# Patient Record
Sex: FEMALE | Race: BLACK OR AFRICAN AMERICAN
[De-identification: names, ages, dates, MRNs, and addresses within clinical notes are randomized per-mention and may not be internally consistent; named-entity substitution may affect disease eponyms.]

---

## 2019-01-27 ENCOUNTER — HOSPITAL ENCOUNTER (EMERGENCY)
Dept: HOSPITAL 72 - EMR | Age: 39
Discharge: HOME | End: 2019-01-27
Payer: MEDICAID

## 2019-01-27 VITALS — DIASTOLIC BLOOD PRESSURE: 87 MMHG | SYSTOLIC BLOOD PRESSURE: 128 MMHG

## 2019-01-27 VITALS — DIASTOLIC BLOOD PRESSURE: 83 MMHG | SYSTOLIC BLOOD PRESSURE: 131 MMHG

## 2019-01-27 VITALS — BODY MASS INDEX: 25.23 KG/M2 | WEIGHT: 157 LBS | HEIGHT: 66 IN

## 2019-01-27 VITALS — DIASTOLIC BLOOD PRESSURE: 80 MMHG | SYSTOLIC BLOOD PRESSURE: 136 MMHG

## 2019-01-27 DIAGNOSIS — Z3A.08: ICD-10-CM

## 2019-01-27 DIAGNOSIS — O46.91: ICD-10-CM

## 2019-01-27 DIAGNOSIS — R10.30: Primary | ICD-10-CM

## 2019-01-27 LAB
ADD MANUAL DIFF: NO
ALBUMIN SERPL-MCNC: 3.2 G/DL (ref 3.4–5)
ALBUMIN/GLOB SERPL: 0.9 {RATIO} (ref 1–2.7)
ALP SERPL-CCNC: 57 U/L (ref 46–116)
ALT SERPL-CCNC: 21 U/L (ref 12–78)
ANION GAP SERPL CALC-SCNC: 9 MMOL/L (ref 5–15)
APPEARANCE UR: (no result)
APTT PPP: (no result) S
AST SERPL-CCNC: 13 U/L (ref 15–37)
BASOPHILS NFR BLD AUTO: 0.7 % (ref 0–2)
BILIRUB SERPL-MCNC: 0.2 MG/DL (ref 0.2–1)
BUN SERPL-MCNC: 9 MG/DL (ref 7–18)
CALCIUM SERPL-MCNC: 9 MG/DL (ref 8.5–10.1)
CHLORIDE SERPL-SCNC: 104 MMOL/L (ref 98–107)
CO2 SERPL-SCNC: 24 MMOL/L (ref 21–32)
CREAT SERPL-MCNC: 0.7 MG/DL (ref 0.55–1.3)
EOSINOPHIL NFR BLD AUTO: 1.5 % (ref 0–3)
ERYTHROCYTE [DISTWIDTH] IN BLOOD BY AUTOMATED COUNT: 12.6 % (ref 11.6–14.8)
GLOBULIN SER-MCNC: 3.5 G/DL
GLUCOSE UR STRIP-MCNC: NEGATIVE MG/DL
HCT VFR BLD CALC: 35.3 % (ref 37–47)
HGB BLD-MCNC: 11.5 G/DL (ref 12–16)
KETONES UR QL STRIP: NEGATIVE
LEUKOCYTE ESTERASE UR QL STRIP: NEGATIVE
LYMPHOCYTES NFR BLD AUTO: 15.8 % (ref 20–45)
MCV RBC AUTO: 80 FL (ref 80–99)
MONOCYTES NFR BLD AUTO: 4.7 % (ref 1–10)
NEUTROPHILS NFR BLD AUTO: 77.4 % (ref 45–75)
NITRITE UR QL STRIP: NEGATIVE
PH UR STRIP: 6.5 [PH] (ref 4.5–8)
PLATELET # BLD: 183 K/UL (ref 150–450)
POTASSIUM SERPL-SCNC: 3.7 MMOL/L (ref 3.5–5.1)
PROT UR QL STRIP: (no result)
RBC # BLD AUTO: 4.39 M/UL (ref 4.2–5.4)
SODIUM SERPL-SCNC: 137 MMOL/L (ref 136–145)
SP GR UR STRIP: 1.01 (ref 1–1.03)
UROBILINOGEN UR-MCNC: NORMAL MG/DL (ref 0–1)
WBC # BLD AUTO: 17.9 K/UL (ref 4.8–10.8)

## 2019-01-27 PROCEDURE — 81003 URINALYSIS AUTO W/O SCOPE: CPT

## 2019-01-27 PROCEDURE — 76830 TRANSVAGINAL US NON-OB: CPT

## 2019-01-27 PROCEDURE — 99284 EMERGENCY DEPT VISIT MOD MDM: CPT

## 2019-01-27 PROCEDURE — 85025 COMPLETE CBC W/AUTO DIFF WBC: CPT

## 2019-01-27 PROCEDURE — 80053 COMPREHEN METABOLIC PANEL: CPT

## 2019-01-27 PROCEDURE — 81025 URINE PREGNANCY TEST: CPT

## 2019-01-27 PROCEDURE — 74018 RADEX ABDOMEN 1 VIEW: CPT

## 2019-01-27 PROCEDURE — 36415 COLL VENOUS BLD VENIPUNCTURE: CPT

## 2019-01-27 PROCEDURE — 84702 CHORIONIC GONADOTROPIN TEST: CPT

## 2019-01-27 PROCEDURE — 76801 OB US < 14 WKS SINGLE FETUS: CPT

## 2019-01-27 PROCEDURE — 83690 ASSAY OF LIPASE: CPT

## 2019-01-27 NOTE — EMERGENCY ROOM REPORT
History of Present Illness


General


Chief Complaint:  Abdominal Pain


Source:  Patient





Present Illness


HPI


38-year-old female presents ED for evaluation.  Patient complaining of lower 

abdominal pain since yesterday.  Localized left lower quadrant, dull, 6 out of 

10, nonradiating.  Denies vaginal bleeding or discharge.  States that her last 

period was in early December.  He is not sure if she is pregnant.  Denies 

nausea or vomiting.  Denies any diarrhea.  No other aggravating relieving 

factors.  Denies any other associated symptoms


Allergies:  


Coded Allergies:  


     No Known Allergies (Unverified , 1/27/19)





Patient History


Past Medical History:  none


Past Surgical History:  none


Pertinent Family History:  none


Social History:  Denies: smoking, alcohol use, drug use


Last Menstrual Period:  12-1


Pregnant Now:  No - not sure


Immunizations:  UTD


Reviewed Nursing Documentation:  PMH: Agreed; PSxH: Agreed





Nursing Documentation-PMH


Past Medical History:  No Stated History





Review of Systems


All Other Systems:  negative except mentioned in HPI





Physical Exam





Vital Signs








  Date Time  Temp Pulse Resp B/P (MAP) Pulse Ox O2 Delivery O2 Flow Rate FiO2


 


1/27/19 14:24 98.4  18 131/83 98 Room Air  


 


1/27/19 14:24  101      








Sp02 EP Interpretation:  reviewed, normal


General Appearance:  no apparent distress, alert, GCS 15, non-toxic


Head:  normocephalic, atraumatic


Eyes:  bilateral eye normal inspection, bilateral eye PERRL


ENT:  hearing grossly normal, normal pharynx, no angioedema, normal voice


Neck:  full range of motion, supple/symm/no masses


Respiratory:  chest non-tender, lungs clear, normal breath sounds, speaking 

full sentences


Cardiovascular #1:  regular rate, rhythm, no edema


Cardiovascular #2:  2+ carotid (R), 2+ carotid (L), 2+ radial (R), 2+ radial (L)

, 2+ dorsalis pedis (R), 2+ dorsalis pedis (L)


Gastrointestinal:  normal bowel sounds, non tender, soft, non-distended, no 

guarding, no rebound


Rectal:  deferred


Genitourinary:  normal inspection, no CVA tenderness


Musculoskeletal:  back normal, gait/station normal, normal range of motion, non-

tender


Neurologic:  alert, oriented x3, responsive, motor strength/tone normal, 

sensory intact, speech normal


Psychiatric:  judgement/insight normal, memory normal, mood/affect normal, no 

suicidal/homicidal ideation


Reflexes:  3+ bicep (R), 3+ bicep (L), 3+ tricep (R), 3+ tricep (L), 3+ knee (R)

, 3+ knee (L)


Skin:  normal color, no rash, warm/dry, well hydrated


Lymphatic:  no adenopathy





Medical Decision Making





Last Vital Signs








  Date Time  Temp Pulse Resp B/P (MAP) Pulse Ox O2 Delivery O2 Flow Rate FiO2


 


1/27/19 14:34  101 18   Room Air  


 


1/27/19 14:24 98.4   131/83 98   








Referrals:  


[facility],REFERRING (PCP)











Satnam Grady MD Jan 27, 2019 16:23

## 2019-01-28 NOTE — DIAGNOSTIC IMAGING REPORT
Indication: Abdominal pain

 

Technique: Supine view of the abdomen

 

Comparison: none

 

Findings: Bowel gas pattern is unremarkable. No unusual masses or calcifications.

 

Impression: No acute process

## 2019-01-28 NOTE — DIAGNOSTIC IMAGING REPORT
Indication: Pelvic pain, pregnant patient

 

Technique: Transabdominal and transvaginal images. Doppler interrogation of the

bilateral ovaries

 

Comparison: none

 

Findings: Uterus measures 10.5 cm length by 6 cm AP PA. Within the right atrium,

there is a gestational sac. This contains a fetal pole with a crown-rump length of

2.1 cm. This corresponds to an estimated gestational age of 8 weeks 5 days. There is

positive fetal heart activity, fetal heart rate 173 bpm. There is a small area of

subchorionic hemorrhage measuring 17 x 8 mm. No myometrial abnormality. Left ovary

measures 3 cm in length. Right ovary measures 2.9 cm in length. Both ovaries

demonstrate normal blood flow. No free cul-de-sac fluid

 

Impression: Positive for 8 week 5 day, by crown-rump length measurement, single live

intrauterine pregnancy.

 

Small 17 x 8 mm subchorionic hemorrhage

## 2019-09-06 ENCOUNTER — HOSPITAL ENCOUNTER (EMERGENCY)
Dept: HOSPITAL 72 - EMR | Age: 39
Discharge: ANOTHER HOSPITAL | End: 2019-09-06
Payer: MEDICAID

## 2019-09-06 VITALS — BODY MASS INDEX: 29.25 KG/M2 | HEIGHT: 66 IN | WEIGHT: 182 LBS

## 2019-09-06 VITALS — SYSTOLIC BLOOD PRESSURE: 142 MMHG | DIASTOLIC BLOOD PRESSURE: 85 MMHG

## 2019-09-06 VITALS — SYSTOLIC BLOOD PRESSURE: 146 MMHG | DIASTOLIC BLOOD PRESSURE: 79 MMHG

## 2019-09-06 VITALS — SYSTOLIC BLOOD PRESSURE: 138 MMHG | DIASTOLIC BLOOD PRESSURE: 90 MMHG

## 2019-09-06 VITALS — DIASTOLIC BLOOD PRESSURE: 73 MMHG | SYSTOLIC BLOOD PRESSURE: 142 MMHG

## 2019-09-06 DIAGNOSIS — I50.1: ICD-10-CM

## 2019-09-06 DIAGNOSIS — R06.00: ICD-10-CM

## 2019-09-06 DIAGNOSIS — I31.3: Primary | ICD-10-CM

## 2019-09-06 LAB
ADD MANUAL DIFF: NO
ALBUMIN SERPL-MCNC: 2.8 G/DL (ref 3.4–5)
ALBUMIN/GLOB SERPL: 0.6 {RATIO} (ref 1–2.7)
ALP SERPL-CCNC: 98 U/L (ref 46–116)
ALT SERPL-CCNC: 48 U/L (ref 12–78)
ANION GAP SERPL CALC-SCNC: 11 MMOL/L (ref 5–15)
APPEARANCE UR: (no result)
APTT PPP: YELLOW S
AST SERPL-CCNC: 25 U/L (ref 15–37)
BASOPHILS NFR BLD AUTO: 0.5 % (ref 0–2)
BILIRUB SERPL-MCNC: 0.6 MG/DL (ref 0.2–1)
BUN SERPL-MCNC: 16 MG/DL (ref 7–18)
CALCIUM SERPL-MCNC: 9.2 MG/DL (ref 8.5–10.1)
CHLORIDE SERPL-SCNC: 105 MMOL/L (ref 98–107)
CK MB SERPL-MCNC: 2.1 NG/ML (ref 0–3.6)
CK SERPL-CCNC: 158 U/L (ref 26–308)
CO2 SERPL-SCNC: 24 MMOL/L (ref 21–32)
CREAT SERPL-MCNC: 0.9 MG/DL (ref 0.55–1.3)
EOSINOPHIL NFR BLD AUTO: 2.8 % (ref 0–3)
ERYTHROCYTE [DISTWIDTH] IN BLOOD BY AUTOMATED COUNT: 14.5 % (ref 11.6–14.8)
GLOBULIN SER-MCNC: 4.4 G/DL
GLUCOSE UR STRIP-MCNC: NEGATIVE MG/DL
HCT VFR BLD CALC: 40 % (ref 37–47)
HGB BLD-MCNC: 13.1 G/DL (ref 12–16)
KETONES UR QL STRIP: NEGATIVE
LEUKOCYTE ESTERASE UR QL STRIP: (no result)
LYMPHOCYTES NFR BLD AUTO: 15.8 % (ref 20–45)
MCV RBC AUTO: 80 FL (ref 80–99)
MONOCYTES NFR BLD AUTO: 7 % (ref 1–10)
NEUTROPHILS NFR BLD AUTO: 73.9 % (ref 45–75)
NITRITE UR QL STRIP: NEGATIVE
PH UR STRIP: 5 [PH] (ref 4.5–8)
PLATELET # BLD: 275 K/UL (ref 150–450)
POTASSIUM SERPL-SCNC: 3.7 MMOL/L (ref 3.5–5.1)
PROT UR QL STRIP: (no result)
RBC # BLD AUTO: 5.02 M/UL (ref 4.2–5.4)
SODIUM SERPL-SCNC: 140 MMOL/L (ref 136–145)
SP GR UR STRIP: 1.01 (ref 1–1.03)
UROBILINOGEN UR-MCNC: NORMAL MG/DL (ref 0–1)
WBC # BLD AUTO: 13.9 K/UL (ref 4.8–10.8)

## 2019-09-06 PROCEDURE — 99291 CRITICAL CARE FIRST HOUR: CPT

## 2019-09-06 PROCEDURE — 36415 COLL VENOUS BLD VENIPUNCTURE: CPT

## 2019-09-06 PROCEDURE — 82553 CREATINE MB FRACTION: CPT

## 2019-09-06 PROCEDURE — 80307 DRUG TEST PRSMV CHEM ANLYZR: CPT

## 2019-09-06 PROCEDURE — 84484 ASSAY OF TROPONIN QUANT: CPT

## 2019-09-06 PROCEDURE — 80053 COMPREHEN METABOLIC PANEL: CPT

## 2019-09-06 PROCEDURE — 93306 TTE W/DOPPLER COMPLETE: CPT

## 2019-09-06 PROCEDURE — 82550 ASSAY OF CK (CPK): CPT

## 2019-09-06 PROCEDURE — 71275 CT ANGIOGRAPHY CHEST: CPT

## 2019-09-06 PROCEDURE — 83880 ASSAY OF NATRIURETIC PEPTIDE: CPT

## 2019-09-06 PROCEDURE — 85025 COMPLETE CBC W/AUTO DIFF WBC: CPT

## 2019-09-06 PROCEDURE — 93005 ELECTROCARDIOGRAM TRACING: CPT

## 2019-09-06 PROCEDURE — 81025 URINE PREGNANCY TEST: CPT

## 2019-09-06 PROCEDURE — 96365 THER/PROPH/DIAG IV INF INIT: CPT

## 2019-09-06 PROCEDURE — 81003 URINALYSIS AUTO W/O SCOPE: CPT

## 2019-09-06 PROCEDURE — 87086 URINE CULTURE/COLONY COUNT: CPT

## 2019-09-06 NOTE — NUR
ED Nurse Note:

Patient walked into ED c/o sub sternal chest pain that he rates 7/10 pain, 
states that this has been an going issue for the past week, patient recently 
gave birth 7 days ago and states that the symptoms started after giving birth, 
patient did take a ibuprofen earlier at 1230 today and has found no relief. IV 
started on left AC 20 gauge, patient is alert and oriented x4 and walks with a 
steady gait

## 2019-09-06 NOTE — EMERGENCY ROOM REPORT
History of Present Illness


General


Chief Complaint:  Chest Pain


Source:  Patient


 (Js Garcia DO)





Present Illness


HPI


Patient presents with complaints of left upper chest pain


Reports that the pain has been ongoing ever since  delivery on 


There is a pleuritic component to it


Denies any vomiting or diarrhea denies any abdominal pain


She feels that laying on her left side improved symptoms


Denies any recent travel otherwise denies any fevers or cough


 (Js Garcia DO)


Allergies:  


Coded Allergies:  


     No Known Allergies (Unverified , 19)





Patient History


Past Medical History:  see triage record


Pregnant Now:  No


:  3


Para:  2


Reviewed Nursing Documentation:  PMH: Agreed; PSxH: Agreed (Js Garcia DO)





Nursing Documentation-PMH


Past Medical History:  No History, Except For


Hx Cardiac Problems:  No - 


Hx Hypertension:  Yes


Hx Pacemaker:  No


Hx Asthma:  No


Hx COPD:  No


Hx Diabetes:  No


Hx Cancer:  No


Hx Gastrointestinal Problems:  No


Hx Dialysis:  No


Hx Neurological Problems:  No


Hx Cerebrovascular Accident:  No


Hx Seizures:  No


 (Js Garcia DO)





Review of Systems


All Other Systems:  negative except mentioned in HPI


 (Js Garcia DO)





Physical Exam





Vital Signs








  Date Time  Temp Pulse Resp B/P (MAP) Pulse Ox O2 Delivery O2 Flow Rate FiO2


 


19 12:50 98.8 112 18 146/79 (101) 99 Room Air  








Sp02 EP Interpretation:  reviewed, normal


General Appearance:  well appearing, no apparent distress


Head:  normocephalic, atraumatic


Eyes:  bilateral eye PERRL, bilateral eye EOMI


ENT:  hearing grossly normal, normal pharynx, TMs + canals normal, uvula midline


Neck:  full range of motion, supple, no meningismus, no bony tend


Respiratory:  lungs clear, normal breath sounds, no rhonchi, no respiratory 

distress, no retraction, no accessory muscle use


Cardiovascular #1:  normal peripheral pulses, no edema, no gallop, no JVD, no 

murmur, tachycardia


Gastrointestinal:  normal bowel sounds, non tender, soft, no mass, no 

organomegaly, non-distended, no guarding, no hernia, no pulsatile mass, no 

rebound


Genitourinary:  no CVA tenderness


Musculoskeletal:  normal inspection


Neurologic:  oriented x3, responsive, CNs III-XII nml as tested, motor strength/

tone normal, sensory intact


Psychiatric:  mood/affect normal


Skin:  no rash


Lymphatic:  normal inspection, no adenopathy


 (Js Garcia DO)





Procedures


Critical Care Time


Critical Care Time


i.  I feel this is a highly complex case requiring extensive working including 

EKG/Rhythm strip, Xray/CT/US, Blood/urine lab work, repeat exams while in ED, 


and administration of strong opiates/narcotics for pain control, admission to 

hospital or close patient follow up.  





Total time: 60 min bedside evaluation and treatment excludes procedures (EKG). 


Reason for critical care: dyspnea, pericardial effusion. 


Possible complications: hypotension, hypertension, MI, shock, arrhythmias, 

metabolic acidosis, end organ damage, respiratory failure. 


Interventions: CTA, discussion with cardiology, stat 2D ECHO


Course: Presenting with shortness of breath.  Status post  on .  

Concern for PE.  CT angio shows no evidence of PE but there is pericardial 

effusion.  Discussed with cardiology.  Concern for postpartum cardiomyopathy.  

Recommended stat 2D echo which shows LV hypokinesis with EF 40 to 45% with 

small pericardial effusion.  LV diastolic dysfunction grade 1.  Mild pulmonary 

hypertension.  Consistent with possible postpartum cardiomyopathy.  Patient 

remains dyspneic.  Somewhat tachycardic.  Recommended to be admitted


Consultations: nursing staff, EMS, family 


Performed by: Dr Grady


Tolerated well condition = serious





j.  because of unstable vital signs this patient had a condition that could 

potentially threaten life or limb.  I feel this is a critical patient who 

required my full attention while patient was considered critical.  Total 

Critical Care Time excluding procedures was greater than 60 minutes


 (Satnam Grady MD)





Medical Decision Making


Diagnostic Impression:  


 Primary Impression:  


 Dyspnea


 Qualified Codes:  R06.00 - Dyspnea, unspecified


 Additional Impressions:  


 Left ventricular hypokinesis


 Pericardial effusion


ER Course


Hospital Course 


39 year-old female presents ED complaining of SOB. s/p csection on .





patient initially seen and evaluated by Dr Garcia; see his note for full 

history and physical





Clinical course





Labsnoted leukocytosis, hemoglobin/hematocrit stable, electro lites okay, 

troponins negative, UDS negative, UA positive bacteria


EKGsinus tachycardia, no acute ischemic changes interpreted by me 


CTA chest -evidence of PE however pericardial effusion noted





discussed with cardiology.  Recommended 2D echo.  2D echo ordered in ED and 

shows LV hypokinesis, reduced EF.  concern for postpartum cardiomyopathy





patient remains dyspneic.   antibiotics given for UTI





Because of insurance patient will be transferred





I.  I feel this is a highly complex case requiring extensive working including 

EKG/Rhythm strip, Xray/CT/US, Blood/urine lab work, repeat exams while in ED, 

and administration of strong opiates/narcotics for pain control, admission to 

hospital or close patient follow up.  





Diagnosis - dyspnea, LV hypokinesis, pericardial effusion


Transferred in serious condition





Labs








Test


  19


13:20 19


13:40


 


White Blood Count


  13.9 K/UL


(4.8-10.8) 


 


 


Red Blood Count


  5.02 M/UL


(4.20-5.40) 


 


 


Hemoglobin


  13.1 G/DL


(12.0-16.0) 


 


 


Hematocrit


  40.0 %


(37.0-47.0) 


 


 


Mean Corpuscular Volume 80 FL (80-99)  


 


Mean Corpuscular Hemoglobin


  26.2 PG


(27.0-31.0) 


 


 


Mean Corpuscular Hemoglobin


Concent 32.8 G/DL


(32.0-36.0) 


 


 


Red Cell Distribution Width


  14.5 %


(11.6-14.8) 


 


 


Platelet Count


  275 K/UL


(150-450) 


 


 


Mean Platelet Volume


  7.4 FL


(6.5-10.1) 


 


 


Neutrophils (%) (Auto)


  73.9 %


(45.0-75.0) 


 


 


Lymphocytes (%) (Auto)


  15.8 %


(20.0-45.0) 


 


 


Monocytes (%) (Auto)


  7.0 %


(1.0-10.0) 


 


 


Eosinophils (%) (Auto)


  2.8 %


(0.0-3.0) 


 


 


Basophils (%) (Auto)


  0.5 %


(0.0-2.0) 


 


 


Sodium Level


  140 MMOL/L


(136-145) 


 


 


Potassium Level


  3.7 MMOL/L


(3.5-5.1) 


 


 


Chloride Level


  105 MMOL/L


() 


 


 


Carbon Dioxide Level


  24 MMOL/L


(21-32) 


 


 


Anion Gap


  11 mmol/L


(5-15) 


 


 


Blood Urea Nitrogen


  16 mg/dL


(7-18) 


 


 


Creatinine


  0.9 MG/DL


(0.55-1.30) 


 


 


Estimat Glomerular Filtration


Rate > 60 mL/min


(>60) 


 


 


Glucose Level


  104 MG/DL


() 


 


 


Calcium Level


  9.2 MG/DL


(8.5-10.1) 


 


 


Total Bilirubin


  0.6 MG/DL


(0.2-1.0) 


 


 


Aspartate Amino Transf


(AST/SGOT) 25 U/L (15-37) 


  


 


 


Alanine Aminotransferase


(ALT/SGPT) 48 U/L (12-78) 


  


 


 


Alkaline Phosphatase


  98 U/L


() 


 


 


Total Creatine Kinase


  158 U/L


() 


 


 


Creatine Kinase MB


  2.1 NG/ML


(0.0-3.6) 


 


 


Creatine Kinase MB Relative


Index 1.3 


  


 


 


Troponin I


  0.000 ng/mL


(0.000-0.056) 


 


 


Pro-B-Type Natriuretic Peptide


  68 pg/mL


(0-125) 


 


 


Total Protein


  7.2 G/DL


(6.4-8.2) 


 


 


Albumin


  2.8 G/DL


(3.4-5.0) 


 


 


Globulin 4.4 g/dL  


 


Albumin/Globulin Ratio 0.6 (1.0-2.7)  


 


Urine Color  Yellow 


 


Urine Appearance


  


  Slightly


cloudy


 


Urine pH  5 (4.5-8.0) 


 


Urine Specific Gravity


  


  1.015


(1.005-1.035)


 


Urine Protein  2+ (NEGATIVE) 


 


Urine Glucose (UA)


  


  Negative


(NEGATIVE)


 


Urine Ketones


  


  Negative


(NEGATIVE)


 


Urine Blood  4+ (NEGATIVE) 


 


Urine Nitrite


  


  Negative


(NEGATIVE)


 


Urine Bilirubin


  


  Negative


(NEGATIVE)


 


Urine Urobilinogen


  


  Normal MG/DL


(0.0-1.0)


 


Urine Leukocyte Esterase  1+ (NEGATIVE) 


 


Urine RBC


  


  5-10 /HPF (0 -


2)


 


Urine WBC


  


  2-4 /HPF (0 -


2)


 


Urine Squamous Epithelial


Cells 


  Moderate /LPF


(NONE/OCC)


 


Urine Bacteria


  


  Moderate /HPF


(NONE)


 


Urine HCG, Qualitative


  


  Negative


(NEGATIVE)


 


Urine Opiates Screen


  


  Negative


(NEGATIVE)


 


Urine Barbiturates Screen


  


  Negative


(NEGATIVE)


 


Phencyclidine (PCP) Screen


  


  Negative


(NEGATIVE)


 


Urine Amphetamines Screen


  


  Negative


(NEGATIVE)


 


Urine Benzodiazepines Screen


  


  Negative


(NEGATIVE)


 


Urine Cocaine Screen


  


  Negative


(NEGATIVE)


 


Urine Marijuana (THC) Screen


  


  Negative


(NEGATIVE)








 (Satnam Grady MD)





EKG Diagnostic Results


Rate:  tachycardiac


Rhythm:  NSR


ST Segments:  no acute changes


ASA given to the pt in ED:  No


 (Satnam Grady MD)





Rhythm Strip Diag. Results


EP Interpretation:  yes


Rhythm:  NSR, no PVC's, no ectopy


 (Satnam Grady MD)





CT/MRI/US Diagnostic Results


CT/MRI/US Diagnostic Results :  


   Imaging Test Ordered:  CTA


   Impression


Findings: The pulmonary artery is well opacified and shows no filling defects. 

There


is no adenopathy. There is no aortic dissection or aneurysm identified within 

the


chest. Trace pericardial effusion demonstrated. Trace bilateral pleural 

effusions are


present with posterior basal atelectasis suspected given the streaky densities. 

No


airspace consolidation seen. Visualized part of the upper abdomen is 

unremarkable.


 (Santam Grady MD)





Last Vital Signs








  Date Time  Temp Pulse Resp B/P (MAP) Pulse Ox O2 Delivery O2 Flow Rate FiO2


 


19 12:55  112 20   Room Air  


 


19 12:55 98.8   146/79 99   








 (Js Garcia DO)


Status:  improved


 (Satnam Grady MD)


[facility]  [facility]


Condition:  Serious











Js Garcia DO Sep 6, 2019 13:33


Satnam Grady MD Sep 6, 2019 17:54

## 2019-09-08 NOTE — CARDIOLOGY REPORT
--------------- APPROVED REPORT --------------





EKG Measurement

Heart [id number]

SC [id number]

[id number]

[id number]

[id number]





Sinus tachycardia

Right atrial enlargement

Borderline ECG

## 2019-09-09 NOTE — CARDIOLOGY REPORT
--------------- APPROVED REPORT --------------





EXAM: Two-dimensional and M-mode echocardiogram with Doppler and color Doppler.



INDICATION

Shortness of breath



M-Mode DIMENSIONS 

IVSd1.4 (0.7-1.1cm)Left Atrium (MM)3.1 (1.6-4.0cm)

LVDd4.0 (3.5-5.6cm)Aortic Root2.8 (2.0-3.7cm)

PWd1.0 (0.7-1.1cm)Aortic Cusp Exc.2.0 (1.5-2.0cm)



LVDs3.0 (2.5-4.0cm)

PWs1.5 cm





Technically difficult study due to poor acoustic windows.

Study quality precludes accurate assessment of regional wall motion, but septal motion is 

abnormal.

Normal left ventricular chamber size.

Global left ventricular hypokinesis.

Left ventricular ejection fraction estimated to be mildly decreased.

Mild left ventricular hypertrophy.

Small posterior pericardial effusion.

All other cardiac chamber sizes are within normal limits. 

Normal appearing aortic, mitral, pulmonic and tricuspid valves.

Mild mitral annulus and aortic root calcification.

IVC is normal in size with physiological collapse. 



A  color flow and spectral Doppler study was performed and revealed:

No aortic regurgitation.

Mild to moderate mitral regurgitation.

Mitral diastolic velocities suggest mild left ventricular diastolic dysfunction (Grade 

I). 

Trace tricuspid regurgitation.

Tricuspid systolic velocities suggests peak right ventricular systolic pressure of 35 

mmHg, consistent with mild pulmonary hypertension.

No pulmonic regurgitation present.

## 2020-03-12 ENCOUNTER — HOSPITAL ENCOUNTER (EMERGENCY)
Dept: HOSPITAL 72 - EMR | Age: 40
Discharge: HOME | End: 2020-03-12
Payer: MEDICAID

## 2020-03-12 VITALS — DIASTOLIC BLOOD PRESSURE: 71 MMHG | SYSTOLIC BLOOD PRESSURE: 114 MMHG

## 2020-03-12 VITALS — HEIGHT: 66 IN | WEIGHT: 180 LBS | BODY MASS INDEX: 28.93 KG/M2

## 2020-03-12 DIAGNOSIS — I10: ICD-10-CM

## 2020-03-12 DIAGNOSIS — R51: Primary | ICD-10-CM

## 2020-03-12 LAB
ADD MANUAL DIFF: NO
ALBUMIN SERPL-MCNC: 3.1 G/DL (ref 3.4–5)
ALBUMIN/GLOB SERPL: 0.8 {RATIO} (ref 1–2.7)
ALP SERPL-CCNC: 85 U/L (ref 46–116)
ALT SERPL-CCNC: 58 U/L (ref 12–78)
ANION GAP SERPL CALC-SCNC: 12 MMOL/L (ref 5–15)
APPEARANCE UR: (no result)
APTT PPP: (no result) S
AST SERPL-CCNC: 23 U/L (ref 15–37)
BASOPHILS NFR BLD AUTO: 1.3 % (ref 0–2)
BILIRUB SERPL-MCNC: 0.3 MG/DL (ref 0.2–1)
BUN SERPL-MCNC: 15 MG/DL (ref 7–18)
CALCIUM SERPL-MCNC: 9.2 MG/DL (ref 8.5–10.1)
CHLORIDE SERPL-SCNC: 102 MMOL/L (ref 98–107)
CO2 SERPL-SCNC: 24 MMOL/L (ref 21–32)
CREAT SERPL-MCNC: 0.9 MG/DL (ref 0.55–1.3)
EOSINOPHIL NFR BLD AUTO: 0.6 % (ref 0–3)
ERYTHROCYTE [DISTWIDTH] IN BLOOD BY AUTOMATED COUNT: 13.1 % (ref 11.6–14.8)
GLOBULIN SER-MCNC: 3.9 G/DL
GLUCOSE UR STRIP-MCNC: NEGATIVE MG/DL
HCT VFR BLD CALC: 38.4 % (ref 37–47)
HGB BLD-MCNC: 13 G/DL (ref 12–16)
KETONES UR QL STRIP: (no result)
LEUKOCYTE ESTERASE UR QL STRIP: (no result)
LYMPHOCYTES NFR BLD AUTO: 29.3 % (ref 20–45)
MCV RBC AUTO: 75 FL (ref 80–99)
MONOCYTES NFR BLD AUTO: 11.8 % (ref 1–10)
NEUTROPHILS NFR BLD AUTO: 57.1 % (ref 45–75)
NITRITE UR QL STRIP: POSITIVE
PH UR STRIP: 5 [PH] (ref 4.5–8)
PLATELET # BLD: 198 K/UL (ref 150–450)
POTASSIUM SERPL-SCNC: 3.9 MMOL/L (ref 3.5–5.1)
PROT UR QL STRIP: (no result)
RBC # BLD AUTO: 5.13 M/UL (ref 4.2–5.4)
SODIUM SERPL-SCNC: 138 MMOL/L (ref 136–145)
SP GR UR STRIP: 1.02 (ref 1–1.03)
UROBILINOGEN UR-MCNC: 1 MG/DL (ref 0–1)
WBC # BLD AUTO: 7.3 K/UL (ref 4.8–10.8)

## 2020-03-12 PROCEDURE — 85025 COMPLETE CBC W/AUTO DIFF WBC: CPT

## 2020-03-12 PROCEDURE — 83690 ASSAY OF LIPASE: CPT

## 2020-03-12 PROCEDURE — 80053 COMPREHEN METABOLIC PANEL: CPT

## 2020-03-12 PROCEDURE — 99283 EMERGENCY DEPT VISIT LOW MDM: CPT

## 2020-03-12 PROCEDURE — 81003 URINALYSIS AUTO W/O SCOPE: CPT

## 2020-03-12 PROCEDURE — 71045 X-RAY EXAM CHEST 1 VIEW: CPT

## 2020-03-12 PROCEDURE — 36415 COLL VENOUS BLD VENIPUNCTURE: CPT

## 2020-03-12 NOTE — EMERGENCY ROOM REPORT
History of Present Illness


General


Chief Complaint:  Headache


Source:  Patient





Present Illness


HPI


Patient presents with complaints of headache body ache ongoing for the past 3 

days complains of mild sore throat and left flank pain as well denies any 

vomiting or diarrhea denies any lower abdominal pain denies any recent travel





Denies any contact with known coronavirus cases


Denies any posterior neck pain or photophobia


Allergies:  


Coded Allergies:  


     No Known Allergies (Unverified , 19)





Patient History


Past Medical History:  see triage record


Last Menstrual Period:  irregular


Pregnant Now:  No


Reviewed Nursing Documentation:  PMH: Agreed; PSxH: Agreed





Nursing Documentation-PMH


Hx Cardiac Problems:  No - 


Hx Hypertension:  Yes


Hx Pacemaker:  No


Hx Asthma:  No


Hx COPD:  No


Hx Diabetes:  No


Hx Cancer:  No


Hx Gastrointestinal Problems:  No


Hx Dialysis:  No


Hx Neurological Problems:  No


Hx Cerebrovascular Accident:  No


Hx Seizures:  No





Review of Systems


All Other Systems:  negative except mentioned in HPI





Physical Exam





Vital Signs








  Date Time  Temp Pulse Resp B/P (MAP) Pulse Ox O2 Delivery O2 Flow Rate FiO2


 


3/12/20 11:25 99.3 114 17 114/71 (85) 97 Room Air  








Sp02 EP Interpretation:  reviewed, normal


General Appearance:  well appearing, no apparent distress


Head:  normocephalic, atraumatic


Eyes:  bilateral eye PERRL, bilateral eye EOMI


ENT:  hearing grossly normal, normal pharynx, TMs + canals normal, uvula midline


Neck:  full range of motion, supple, no meningismus, no bony tend


Respiratory:  lungs clear, normal breath sounds, no rhonchi, no respiratory 

distress, no retraction, no accessory muscle use


Cardiovascular #1:  normal peripheral pulses, regular rate, rhythm, no edema, 

no gallop, no JVD, no murmur


Gastrointestinal:  normal bowel sounds, non tender, soft, no mass, no 

organomegaly, non-distended, no guarding, no hernia, no pulsatile mass, no 

rebound


Genitourinary:  no CVA tenderness


Musculoskeletal:  normal inspection


Neurologic:  motor strength/tone normal, CNs III-XII nml as tested, oriented x3

, sensory intact, responsive


Psychiatric:  mood/affect normal


Skin:  no rash


Lymphatic:  normal inspection, no adenopathy





Medical Decision Making


Diagnostic Impression:  


 Primary Impression:  


 Headache


ER Course


Multiple differentials including but not limited to neurological, neurosurgical

, infectious process were entertained


Patient had blood work initiated





All within normal limits


Patient's urine sample shows a large amount of blood


Urine nitrite was reported positive however leukocytes and bacteria were 

negative possibly from the blood


Patient does not have any dysuria symptoms and at this time stable for close 

outpatient follow-up





Labs








Test


  3/12/20


11:40


 


White Blood Count


  7.3 K/UL


(4.8-10.8)


 


Red Blood Count


  5.13 M/UL


(4.20-5.40)


 


Hemoglobin


  13.0 G/DL


(12.0-16.0)


 


Hematocrit


  38.4 %


(37.0-47.0)


 


Mean Corpuscular Volume 75 FL (80-99) 


 


Mean Corpuscular Hemoglobin


  25.3 PG


(27.0-31.0)


 


Mean Corpuscular Hemoglobin


Concent 33.8 G/DL


(32.0-36.0)


 


Red Cell Distribution Width


  13.1 %


(11.6-14.8)


 


Platelet Count


  198 K/UL


(150-450)


 


Mean Platelet Volume


  8.6 FL


(6.5-10.1)


 


Neutrophils (%) (Auto)


  57.1 %


(45.0-75.0)


 


Lymphocytes (%) (Auto)


  29.3 %


(20.0-45.0)


 


Monocytes (%) (Auto)


  11.8 %


(1.0-10.0)


 


Eosinophils (%) (Auto)


  0.6 %


(0.0-3.0)


 


Basophils (%) (Auto)


  1.3 %


(0.0-2.0)


 


Urine Color Red 


 


Urine Appearance Cloudy 


 


Urine pH 5 (4.5-8.0) 


 


Urine Specific Gravity


  1.020


(1.005-1.035)


 


Urine Protein 3+ (NEGATIVE) 


 


Urine Glucose (UA)


  Negative


(NEGATIVE)


 


Urine Ketones 1+ (NEGATIVE) 


 


Urine Blood 5+ (NEGATIVE) 


 


Urine Nitrite


  Positive


(NEGATIVE)


 


Urine Bilirubin


  Negative


(NEGATIVE)


 


Urine Urobilinogen


  1 MG/DL


(0.0-1.0)


 


Urine Leukocyte Esterase 1+ (NEGATIVE) 


 


Urine RBC


  Tntc /HPF (0 -


2)


 


Urine WBC


  2-4 /HPF (0 -


2)


 


Urine Squamous Epithelial


Cells Few /LPF


(NONE/OCC)


 


Urine Bacteria


  Few /HPF


(NONE)


 


Sodium Level


  138 MMOL/L


(136-145)


 


Potassium Level


  3.9 MMOL/L


(3.5-5.1)


 


Chloride Level


  102 MMOL/L


()


 


Carbon Dioxide Level


  24 MMOL/L


(21-32)


 


Anion Gap


  12 mmol/L


(5-15)


 


Blood Urea Nitrogen


  15 mg/dL


(7-18)


 


Creatinine


  0.9 MG/DL


(0.55-1.30)


 


Estimat Glomerular Filtration


Rate > 60 mL/min


(>60)


 


Glucose Level


  116 MG/DL


()


 


Calcium Level


  9.2 MG/DL


(8.5-10.1)


 


Total Bilirubin


  0.3 MG/DL


(0.2-1.0)


 


Aspartate Amino Transf


(AST/SGOT) 23 U/L (15-37) 


 


 


Alanine Aminotransferase


(ALT/SGPT) 58 U/L (12-78) 


 


 


Alkaline Phosphatase


  85 U/L


()


 


Total Protein


  7.0 G/DL


(6.4-8.2)


 


Albumin


  3.1 G/DL


(3.4-5.0)


 


Globulin 3.9 g/dL 


 


Albumin/Globulin Ratio 0.8 (1.0-2.7) 


 


Lipase


  159 U/L


()








Chest X-Ray Diagnostic Results


Chest X-Ray Diagnostic Results :  


   Chest X-Ray Ordered:  Yes


   # of Views/Limited/Complete:  1 View


   Indication:  Chest Pain


   EP Interpretation:  Yes


   Interpretation:  no consolidation, no effusion, no pneumothorax


   Impression:  No acute disease


   Electronically Signed by:  Js Garcia DO





Last Vital Signs








  Date Time  Temp Pulse Resp B/P (MAP) Pulse Ox O2 Delivery O2 Flow Rate FiO2


 


3/12/20 11:46 99.3  17 114/71 97 Room Air  


 


3/12/20 11:25  114      








Status:  improved


Disposition:  HOME, SELF-CARE


Condition:  Improved


Scripts


Nitrofurantoin Monohyd/M-Cryst* (MACROBID 100 MG*) 100 Mg Capsule


100 MG ORAL EVERY 12 HOURS for 5 Days, CAP


   Prov: Js Garcia DO         3/12/20 


Oseltamivir Phosphate (Tamiflu) 75 Mg Capsule


75 MG ORAL TWICE A DAY for 5 Days, CAP


   Prov: Js Garcia DO         3/12/20 


Ibuprofen* (MOTRIN*) 600 Mg Tablet


600 MG ORAL Q8H PRN for For Pain, #20 TAB 0 Refills


   Prov: Js Garcia DO         3/12/20





Additional Instructions:  


Patient is provided with the discharge instructions notified to follow up with 

primary doctor in the next 2-3 days otherwise return to the er with any 

worsening symptoms.


Please note that this report is being documented using DRAGON technology.  This 

can lead to erroneous entry secondary to incorrect interpretation by the 

dictating instrument.











Js Garcia DO Mar 12, 2020 13:09

## 2020-03-12 NOTE — NUR
ED Nurse Note:



Pt ambulated to ED from home with c/o generalized body weakness and fever x 3 
days. Pt denies any recent travels nor exposure to large crowded areas. Pt 
states she stayed home all the time. Pt added she just started getting her depo 
shots. Pt is AOx4, calm and cooperative; tachycardic on triage. Placed on bed 
and gown.

## 2020-03-12 NOTE — DIAGNOSTIC IMAGING REPORT
Indication: Cough

 

Technique: One view of the chest

 

Comparison: No comparison studies

 

Findings: Lungs and pleural spaces are clear. The heart size is upper limits normal.

 

Impression:  No acute process

## 2021-05-31 NOTE — NUR
ED Nurse Note:



Per pt after taking depo shots, she's been having some heavy bleeding. Latest 
BP: 127/83. Spouse at bedside. No

## 2022-05-29 NOTE — NUR
ED Nurse Note:



Blood collected and sent.
ED Nurse Note:



Dr Grady ordered to cancel abdominal xray.
ED Nurse Note:



Patient back from US. No distress.
ED Nurse Note:



Pregnancy test shows positive result. ER MD at the bed side. Pt is aware of the 
result. Awaiting for US.
ED Nurse Note:



Pt came in due to LLQ abd pain started yesterday morning and feels bloated. 
Denies N/V. LBM 2 days ago. Pt also states that hse missed her menstrual period 
this month. Pt is AAO x4, ambulates with steady gait. No respiratory distress. 
VSS.
ED Nurse Note:



Pt cleared by ER MD for discharge. ACI was given and explained to pt  that she 
needs to visit her OB GYN and verbalized understanding of teachings. All 
medical devices such as ID band/IV removed. Pt is AAO x4, ambulatory with 
steady gait and left with all personal belongings.
ED Nurse Note:



Pt resting on bed with blankets provided. Waiting for U/A result.
ED Nurse Note:



Pt went down for US.
Universal Safety Interventions

## 2025-06-19 NOTE — DIAGNOSTIC IMAGING REPORT
Indication: Chest pain

 

Technique: Continuous helical transaxial imaging of the chest was obtained from the

thoracic inlet to the upper abdomen during rapid intravenous contrast administration.

Arterial phase of enhancement obtained. Coronal 2-D reformats were also obtained and

maximum intensity projection images in multiple planes. Study obtained in a [employer]

sensation 64 slice CT. Automatic Exposure Control was utilized.

 

 

Total Dose length Product (DLP):  1202.09 mGycm

 

CT Dose Index Volume (CTDIvol):   30.37 mGy

 

Comparison: None

 

Findings: The pulmonary artery is well opacified and shows no filling defects. There

is no adenopathy. There is no aortic dissection or aneurysm identified within the

chest. Trace pericardial effusion demonstrated. Trace bilateral pleural effusions are

present with posterior basal atelectasis suspected given the streaky densities. No

airspace consolidation seen. Visualized part of the upper abdomen is unremarkable.

 

IMPRESSION:

 

No evidence of pulmonary embolus, aortic dissection or aneurysm.

 

Patchy basilar atelectasis.

 

Trace bilateral pleural effusions

 

Trace pericardial effusion

 

 

 

The CT scanner at [facility] is accredited by the [employer]

[employer] and the scans are performed using dose optimization techniques as

appropriate to a performed exam including Automatic Exposure control.
What Type Of Note Output Would You Prefer (Optional)?: Bullet Format
How Severe Is Your Skin Lesion?: mild
Is This A New Presentation, Or A Follow-Up?: Skin Lesion